# Patient Record
Sex: MALE | Race: BLACK OR AFRICAN AMERICAN | Employment: FULL TIME | URBAN - NONMETROPOLITAN AREA
[De-identification: names, ages, dates, MRNs, and addresses within clinical notes are randomized per-mention and may not be internally consistent; named-entity substitution may affect disease eponyms.]

---

## 2018-10-10 ENCOUNTER — HOSPITAL ENCOUNTER (EMERGENCY)
Age: 23
Discharge: HOME | End: 2018-10-10
Attending: EMERGENCY MEDICINE
Payer: MEDICAID

## 2018-10-10 VITALS
OXYGEN SATURATION: 98 % | SYSTOLIC BLOOD PRESSURE: 158 MMHG | HEART RATE: 59 BPM | DIASTOLIC BLOOD PRESSURE: 107 MMHG | RESPIRATION RATE: 19 BRPM

## 2018-10-10 DIAGNOSIS — F39 MOOD DISORDER (HCC): ICD-10-CM

## 2018-10-10 DIAGNOSIS — F41.0 ANXIETY ATTACK: Primary | ICD-10-CM

## 2018-10-10 PROCEDURE — 99282 EMERGENCY DEPT VISIT SF MDM: CPT

## 2018-10-10 PROCEDURE — 6370000000 HC RX 637 (ALT 250 FOR IP): Performed by: EMERGENCY MEDICINE

## 2018-10-10 RX ORDER — ESCITALOPRAM OXALATE 5 MG/1
10 TABLET ORAL ONCE
Status: COMPLETED | OUTPATIENT
Start: 2018-10-10 | End: 2018-10-10

## 2018-10-10 RX ORDER — ESCITALOPRAM OXALATE 10 MG/1
10 TABLET ORAL DAILY
Qty: 30 TABLET | Refills: 0 | Status: SHIPPED | OUTPATIENT
Start: 2018-10-10

## 2018-10-10 RX ORDER — HYDROXYZINE 50 MG/1
50 TABLET, FILM COATED ORAL ONCE
Status: COMPLETED | OUTPATIENT
Start: 2018-10-10 | End: 2018-10-10

## 2018-10-10 RX ORDER — HYDROXYZINE 50 MG/1
50 TABLET, FILM COATED ORAL 3 TIMES DAILY PRN
Qty: 30 TABLET | Refills: 0 | Status: SHIPPED | OUTPATIENT
Start: 2018-10-10 | End: 2018-10-20

## 2018-10-10 RX ADMIN — ESCITALOPRAM OXALATE 10 MG: 5 TABLET, FILM COATED ORAL at 13:46

## 2018-10-10 RX ADMIN — HYDROXYZINE HYDROCHLORIDE 50 MG: 50 TABLET, FILM COATED ORAL at 13:38

## 2018-10-10 ASSESSMENT — PAIN DESCRIPTION - LOCATION: LOCATION: RIB CAGE

## 2018-10-10 ASSESSMENT — PAIN SCALES - GENERAL: PAINLEVEL_OUTOF10: 6

## 2018-10-10 ASSESSMENT — PAIN DESCRIPTION - PAIN TYPE: TYPE: ACUTE PAIN

## 2018-10-10 ASSESSMENT — PAIN DESCRIPTION - ORIENTATION: ORIENTATION: RIGHT;LEFT

## 2018-10-10 NOTE — ED PROVIDER NOTES
[facility] ED  eMERGENCY dEPARTMENT eNCOUnter      Pt Name: Roberto Reyes  MRN: [id number]  [place] 1995  Date of evaluation: 10/10/2018  Provider: DO Dev Lockwood       Chief Complaint   Patient presents with    Anxiety     states he has been having increase in anxiety and states he knows when it is going to rain because he fractured ribs last year and is having pain because it is about to rain         HISTORY OF PRESENT ILLNESS   (Location/Symptom, Timing/Onset, Context/Setting, Quality, Duration, Modifying Factors, Severity) Note limiting factors. HPI    Roberto Reyes is a 26 y.o. male who presents to the emergency department Complaining of anxiety. He states he's been dealing with stressors, school and other  things over a year. He has medicaid and  no private insurance. He has not taken medications before for mental health , but has talked to people in the past.  He does not have a primary care doctor. He states he typically goes to emergency departments and urgent cares for treatment. I educated and advised the patient that he really needs to follow up with a primary care physician. He may need to seek out psychiatry. He is not suicidal or homicidal.    He states he gets lack of sleep he states that he is irritated quickly he has a hard time shutting his mind down  No cardiac pain  No shortness of breath. He also has rib pain from previous rib fractures from years ago and he also has some lesions on his arm from picking his skin. No infection    No findings of a medical emergency on  history or examination    Nursing Notes were reviewed. REVIEW OF SYSTEMS    (2+ for level 4;10+ for level 5)   Review of Systems   systems reviewed and otherwise acutely negative except as in the [address]     PAST MEDICAL HISTORY     Past Medical History:   Diagnosis Date    Mild intermittent asthma     Shoulder subluxation     12/28/2011        SURGICAL HISTORY No past surgical history on file. CURRENT MEDICATIONS       Previous Medications    No medications on file       ALLERGIES     No known drug allergy    FAMILY HISTORY       Family History   Problem Relation Age of Onset    Allergies Maternal Grandmother     Diabetes Paternal Grandmother         SOCIAL HISTORY       Social History     Social History    Marital status: Single     Spouse name: N/A    Number of children: N/A    Years of education: N/A     Social History Main Topics    Smoking status: Passive Smoke Exposure - Never Smoker    Smokeless tobacco: Not on file    Alcohol use Not on file    Drug use: No    Sexual activity: Not on file     Other Topics Concern    Not on file     Social History Narrative    No narrative on file       SCREENINGS      @FLOW([id number])@    PHYSICAL EXAM    (up to 7 for level 4, 8 or more for level 5)     ED Triage Vitals [10/10/18 1258]   BP Temp Temp src Pulse Resp SpO2 Height Weight   (!) 158/107 -- -- 59 19 98 % -- --     ED Triage Vitals [10/10/18 1258]   Enc Vitals Group      BP (!) 158/107      Pulse 59      Resp 19      Temp       Temp src       SpO2 98 %      Weight       Height       Head Circumference       Peak Flow       Pain Score       Pain Loc       Pain Edu? Excl. in [address]? Physical Exam    GENERAL APPEARANCE: Awake and alert. Cooperative. No acute distress. Muscular, nontoxic, awake alert well-groomed  HEAD: Normocephalic. Atraumatic. EYES: Sclera anicteric. ENT: Moist mucous membranes  NECK: Supple. Full range of motion  CARDIO: RRR. Bilateral,radial pulses 2+ and equal.   LUNGS: Respirations unlabored. CTAB. EXTREMITIES: No acute deformities. Muscular strong  SKIN: Warm and dry. No petechiae/ purpura    PSYCHIATRIC: Normal mood. Anxious affect. Rapid speech. Not suicidal, homicidal        DIAGNOSTIC RESULTS     EKG (Per Emergency Physician):       RADIOLOGY (Per Emergency Physician):        Interpretation per the Radiologist

## 2018-11-06 ENCOUNTER — HOSPITAL ENCOUNTER (EMERGENCY)
Age: 23
Discharge: LEFT BEFORE DISCHARGE | End: 2018-11-06
Payer: MEDICAID